# Patient Record
Sex: FEMALE | Race: WHITE | Employment: FULL TIME | URBAN - METROPOLITAN AREA
[De-identification: names, ages, dates, MRNs, and addresses within clinical notes are randomized per-mention and may not be internally consistent; named-entity substitution may affect disease eponyms.]

---

## 2017-01-03 ENCOUNTER — TELEPHONE (OUTPATIENT)
Dept: OBGYN CLINIC | Facility: CLINIC | Age: 37
End: 2017-01-03

## 2017-01-03 NOTE — TELEPHONE ENCOUNTER
Pt is requesting to schedule f/up appt with Dr. Demar Tyson. Pt states that she gave birth on 12/26 but baby passed away after. Per pt's last visit, 's note states RTO around 01/19/17 for post miscarriage f/up. Pt states that she did not have a miscarriage.  P

## 2017-01-03 NOTE — TELEPHONE ENCOUNTER
Per  recommendations on  12/29/16, pt was to f/u in two weeks. Per pt due to work schedule can only come on saturdays. Next Saturday available is on 2-4-17.  Apt made  Routing to , is this apt too far out? to advise upon his return

## 2017-01-07 NOTE — TELEPHONE ENCOUNTER
I attempted to contact patient on her cell phone but there is no answer. I left a voicemail that February 4, 2017 would be fine for her next visit but if she had any problems or concerns before then to please contact us.

## 2017-01-27 PROBLEM — K50.919 CROHN'S DISEASE WITH COMPLICATION, UNSPECIFIED GASTROINTESTINAL TRACT LOCATION (HCC): Status: ACTIVE | Noted: 2017-01-27

## 2017-02-01 ENCOUNTER — TELEPHONE (OUTPATIENT)
Dept: OBGYN CLINIC | Facility: CLINIC | Age: 37
End: 2017-02-01

## 2017-02-01 NOTE — TELEPHONE ENCOUNTER
Pt states she pepper been feeling depressed about recent miscarriage and would like to know if there are any antidepressants she can be prescribed.  Pl adv

## 2017-02-03 NOTE — TELEPHONE ENCOUNTER
I again tried patient but no answer. Left message on VM that I had called and I will try again later.

## 2017-02-04 ENCOUNTER — OFFICE VISIT (OUTPATIENT)
Dept: OBGYN CLINIC | Facility: CLINIC | Age: 37
End: 2017-02-04

## 2017-02-04 VITALS — WEIGHT: 179 LBS | DIASTOLIC BLOOD PRESSURE: 80 MMHG | BODY MASS INDEX: 26 KG/M2 | SYSTOLIC BLOOD PRESSURE: 141 MMHG

## 2017-02-04 DIAGNOSIS — F43.21 GRIEF AT LOSS OF CHILD: ICD-10-CM

## 2017-02-04 DIAGNOSIS — Z63.4 GRIEF AT LOSS OF CHILD: ICD-10-CM

## 2017-02-04 PROCEDURE — 99214 OFFICE O/P EST MOD 30 MIN: CPT | Performed by: OBSTETRICS & GYNECOLOGY

## 2017-02-04 SDOH — SOCIAL STABILITY - SOCIAL INSECURITY: DISSAPEARANCE AND DEATH OF FAMILY MEMBER: Z63.4

## 2017-02-05 NOTE — PROGRESS NOTES
[facility]  Obstetrics and Gynecology  Focused Gynecology Problem Exam  Aleena Talley MD    Byron Foley is a 39year old female presenting for Depression  .     HPI:   Patient presents with:  Depression: Follow up after pregnancy loss    Ana M Date GA Lbr [place]/2nd Weight Sex Delivery Anes PTL Lv   2 SAB 12/26/16 19w1d   M SAB  Y ND   1 Term 02/14/00 42w0d  9 lb 10 oz (4.366 kg) F NORMAL SPONT EPI N Y      Obstetric Comments   IOL for post dates       Past Medical History   Diagnosis Date   • [facility] having mild postpartum depression symptoms.  (F43.21,  Z63.4) Grief at loss of child      PLAN:   Patient counseled on the options for treatment of postpartum depression including counseling and pharmacologic treatment.   Patient has attempted to find a cou

## 2017-03-05 NOTE — PROGRESS NOTES
[facility]  Obstetrics and Gynecology  Focused Gynecology Problem Exam  Shaye Hull MD    Carol Rodriguez is a 39year old female presenting for Gyn Exam  .    HPI:   Patient presents with:  Gyn Exam: Follow up miscarriage     Patient is here fo Maternal Grandmother    • Psychiatric Daughter      bipolar         Social History   Marital Status: Single  Spouse Name: N/A    Years of Education: N/A  Number of Children: [address]       Social History [facility]

## (undated) NOTE — MR AVS SNAPSHOT
[facility]  [address] [facility], [address]   [phone]               Thank you for choosing us for your health care visit with Ember Beauchamp MD, MD.  We are glad to serve you and happy to provide you with this summa [email or web address]     Sign up for [employer], your secure online medical record. [employer] for [employer] will allow you to access patient instructions from your recent visit,  view other health information, and more. To sign up or find more information, go to [email or web address]

## (undated) NOTE — MR AVS SNAPSHOT
[facility]  [address] [facility], [address] [facility]   [phone]               Thank you for choosing us for your health care visit with Alena Ivory MD, MD.  We are glad to serve you and happy to provide you with this summa [phone], [address] [facility] [address]    Hours:  24-hours Phone:  [phone]    - Sertraline HCl 50 MG Tabs            Follow-up Instructions     Return in about 2 weeks (around 2/18/2017) for with Dr. Lopez hensley